# Patient Record
Sex: MALE | Race: BLACK OR AFRICAN AMERICAN | URBAN - METROPOLITAN AREA
[De-identification: names, ages, dates, MRNs, and addresses within clinical notes are randomized per-mention and may not be internally consistent; named-entity substitution may affect disease eponyms.]

---

## 2019-07-17 ENCOUNTER — OFFICE (OUTPATIENT)
Dept: URBAN - METROPOLITAN AREA CLINIC 19 | Facility: CLINIC | Age: 64
End: 2019-07-17

## 2019-07-17 VITALS
HEART RATE: 81 BPM | HEIGHT: 74 IN | SYSTOLIC BLOOD PRESSURE: 124 MMHG | WEIGHT: 220 LBS | DIASTOLIC BLOOD PRESSURE: 83 MMHG

## 2019-07-17 DIAGNOSIS — M43.8X9 OTHER SPECIFIED DEFORMING DORSOPATHIES, SITE UNSPECIFIED: ICD-10-CM

## 2019-07-17 DIAGNOSIS — R14.0 ABDOMINAL DISTENSION (GASEOUS): ICD-10-CM

## 2019-07-17 DIAGNOSIS — K59.00 CONSTIPATION, UNSPECIFIED: ICD-10-CM

## 2019-07-17 DIAGNOSIS — R10.9 UNSPECIFIED ABDOMINAL PAIN: ICD-10-CM

## 2019-07-17 LAB
CBC, PLATELET, NO DIFFERENTIAL: HEMATOCRIT: 40.4 % (ref 37.5–51)
CBC, PLATELET, NO DIFFERENTIAL: HEMOGLOBIN: 13.5 G/DL (ref 13–17.7)
CBC, PLATELET, NO DIFFERENTIAL: MCH: 30.4 PG (ref 26.6–33)
CBC, PLATELET, NO DIFFERENTIAL: MCHC: 33.4 G/DL (ref 31.5–35.7)
CBC, PLATELET, NO DIFFERENTIAL: MCV: 91 FL (ref 79–97)
CBC, PLATELET, NO DIFFERENTIAL: PLATELETS: 379 X10E3/UL (ref 150–450)
CBC, PLATELET, NO DIFFERENTIAL: RBC: 4.44 X10E6/UL (ref 4.14–5.8)
CBC, PLATELET, NO DIFFERENTIAL: RDW: 13.5 % (ref 12.3–15.4)
CBC, PLATELET, NO DIFFERENTIAL: WBC: 9.6 X10E3/UL (ref 3.4–10.8)
COMP. METABOLIC PANEL (14): A/G RATIO: 1.3 (ref 1.2–2.2)
COMP. METABOLIC PANEL (14): ALBUMIN: 4.3 G/DL (ref 3.6–4.8)
COMP. METABOLIC PANEL (14): ALKALINE PHOSPHATASE: 413 IU/L — HIGH (ref 39–117)
COMP. METABOLIC PANEL (14): ALT (SGPT): 24 IU/L (ref 0–44)
COMP. METABOLIC PANEL (14): AST (SGOT): 30 IU/L (ref 0–40)
COMP. METABOLIC PANEL (14): BILIRUBIN, TOTAL: 0.2 MG/DL (ref 0–1.2)
COMP. METABOLIC PANEL (14): BUN/CREATININE RATIO: 15 (ref 10–24)
COMP. METABOLIC PANEL (14): BUN: 20 MG/DL (ref 8–27)
COMP. METABOLIC PANEL (14): CALCIUM: 10.1 MG/DL (ref 8.6–10.2)
COMP. METABOLIC PANEL (14): CARBON DIOXIDE, TOTAL: 21 MMOL/L (ref 20–29)
COMP. METABOLIC PANEL (14): CHLORIDE: 109 MMOL/L — HIGH (ref 96–106)
COMP. METABOLIC PANEL (14): CREATININE: 1.37 MG/DL — HIGH (ref 0.76–1.27)
COMP. METABOLIC PANEL (14): EGFR IF AFRICN AM: 63 ML/MIN/1.73 (ref 59–?)
COMP. METABOLIC PANEL (14): EGFR IF NONAFRICN AM: 55 ML/MIN/1.73 — LOW (ref 59–?)
COMP. METABOLIC PANEL (14): GLOBULIN, TOTAL: 3.3 G/DL (ref 1.5–4.5)
COMP. METABOLIC PANEL (14): GLUCOSE: 106 MG/DL — HIGH (ref 65–99)
COMP. METABOLIC PANEL (14): POTASSIUM: 5.9 MMOL/L — HIGH (ref 3.5–5.2)
COMP. METABOLIC PANEL (14): PROTEIN, TOTAL: 7.6 G/DL (ref 6–8.5)
COMP. METABOLIC PANEL (14): SODIUM: 145 MMOL/L — HIGH (ref 134–144)
ENDOMYSIAL ANTIBODY IGA: NEGATIVE
IMMUNOGLOBULIN A, QN, SERUM: 304 MG/DL (ref 61–437)
T-TRANSGLUTAMINASE (TTG) IGA: <2 U/ML
TSH: 1.05 UIU/ML (ref 0.45–4.5)

## 2019-07-17 PROCEDURE — 99204 OFFICE O/P NEW MOD 45 MIN: CPT | Performed by: INTERNAL MEDICINE

## 2019-07-17 RX ORDER — HYOSCYAMINE SULFATE 0.12 MG/1
TABLET ORAL; SUBLINGUAL
Qty: 120 | Refills: 3 | Status: ACTIVE
Start: 2019-07-17

## 2019-07-17 RX ORDER — SODIUM SULFATE, POTASSIUM SULFATE, MAGNESIUM SULFATE 17.5; 3.13; 1.6 G/ML; G/ML; G/ML
SOLUTION, CONCENTRATE ORAL
Qty: 1 | Refills: 0 | Status: COMPLETED
Start: 2019-07-17 | End: 2019-07-22

## 2019-07-17 NOTE — SERVICENOTES
The patient has had six weeks of what he describes as bloating and gas pain that will involve his left side, left abdomen, and back intermittently.  He states the pain is worse whenever he stands up.  He did request opioid pain medications from me for this not told him that I would not prescribe opioid pain medication for such pain. Patient is overdue for surveillance colonoscopy so we will need to go ahead and proceed with this.  I will go ahead and check some blood work as above.  Well as symptoms could be related to gas pain or constipation I do wonder of back pain is in the differential as he could have a pinched nerve that is radiating around to his side. We will go and proceed with colonoscopy and I will try to obtain the CT scan to ensure that it was with contrast.  Will also give a trial of antispasmodic to see if this may help.  If having severe pain he should go to the ED.

## 2019-07-17 NOTE — SERVICEHPINOTES
Mr. Garcia is a 64-year-old man here for evaluation of abdominal pain, flank pain, and back pain. The patient states that he has been having these issues for the past six weeks. He had issues initially a year ago when he was on a trip and eight hamburger which lasted for a few days and then went away. He states symptoms then came back when he had another large hamburger or [place] steak and symptoms have persisted. He states that he will get we describes as a gas pain in his left side of his abdomen that will sometimes moved to his left flank or back. It is worse whenever he stands up. It is worse with eating. It is better when passing gas or having a bowel movement. He states that he only has a bowel movement every 1-2 days but does not take anything on a regular basis for this. He has been occasionally taking milk of magnesia and Pepto-Bismol. He did have one black bowel movement on one day but has not had any since then. His last colonoscopy was in 2012 with removal of an adenomatous polyp. He is overdue for his next surveillance colonoscopy. He states that he did have a CT scan by his PCP which was unremarkable. He denies any fevers or chills. He states that he was referred to [facility] but did not go because he thinks this is due to his intestine. He has been taking opioid pain medicines because of this.

## 2019-07-19 ENCOUNTER — OFFICE (OUTPATIENT)
Dept: URBAN - METROPOLITAN AREA CLINIC 19 | Facility: CLINIC | Age: 64
End: 2019-07-19

## 2019-07-22 ENCOUNTER — AMBULATORY SURGICAL CENTER (OUTPATIENT)
Dept: URBAN - METROPOLITAN AREA SURGERY 3 | Facility: SURGERY | Age: 64
End: 2019-07-22

## 2019-07-22 ENCOUNTER — OFFICE (OUTPATIENT)
Dept: URBAN - METROPOLITAN AREA PATHOLOGY 22 | Facility: PATHOLOGY | Age: 64
End: 2019-07-22
Payer: COMMERCIAL

## 2019-07-22 VITALS
HEART RATE: 73 BPM | DIASTOLIC BLOOD PRESSURE: 83 MMHG | OXYGEN SATURATION: 95 % | DIASTOLIC BLOOD PRESSURE: 60 MMHG | HEIGHT: 74 IN | HEART RATE: 81 BPM | DIASTOLIC BLOOD PRESSURE: 77 MMHG | DIASTOLIC BLOOD PRESSURE: 76 MMHG | HEART RATE: 88 BPM | RESPIRATION RATE: 24 BRPM | SYSTOLIC BLOOD PRESSURE: 138 MMHG | DIASTOLIC BLOOD PRESSURE: 83 MMHG | DIASTOLIC BLOOD PRESSURE: 84 MMHG | SYSTOLIC BLOOD PRESSURE: 138 MMHG | SYSTOLIC BLOOD PRESSURE: 129 MMHG | HEART RATE: 88 BPM | TEMPERATURE: 97.6 F | SYSTOLIC BLOOD PRESSURE: 130 MMHG | OXYGEN SATURATION: 94 % | HEIGHT: 74 IN | DIASTOLIC BLOOD PRESSURE: 77 MMHG | OXYGEN SATURATION: 96 % | HEART RATE: 88 BPM | SYSTOLIC BLOOD PRESSURE: 130 MMHG | DIASTOLIC BLOOD PRESSURE: 60 MMHG | OXYGEN SATURATION: 95 % | RESPIRATION RATE: 18 BRPM | RESPIRATION RATE: 21 BRPM | SYSTOLIC BLOOD PRESSURE: 84 MMHG | WEIGHT: 220 LBS | SYSTOLIC BLOOD PRESSURE: 84 MMHG | RESPIRATION RATE: 24 BRPM | DIASTOLIC BLOOD PRESSURE: 76 MMHG | DIASTOLIC BLOOD PRESSURE: 84 MMHG | SYSTOLIC BLOOD PRESSURE: 124 MMHG | DIASTOLIC BLOOD PRESSURE: 83 MMHG | SYSTOLIC BLOOD PRESSURE: 84 MMHG | SYSTOLIC BLOOD PRESSURE: 138 MMHG | WEIGHT: 220 LBS | HEIGHT: 74 IN | HEART RATE: 86 BPM | HEART RATE: 81 BPM | OXYGEN SATURATION: 95 % | HEART RATE: 86 BPM | HEART RATE: 73 BPM | DIASTOLIC BLOOD PRESSURE: 77 MMHG | SYSTOLIC BLOOD PRESSURE: 129 MMHG | WEIGHT: 220 LBS | RESPIRATION RATE: 18 BRPM | OXYGEN SATURATION: 96 % | RESPIRATION RATE: 18 BRPM | SYSTOLIC BLOOD PRESSURE: 130 MMHG | HEART RATE: 86 BPM | SYSTOLIC BLOOD PRESSURE: 124 MMHG | HEART RATE: 73 BPM | DIASTOLIC BLOOD PRESSURE: 60 MMHG | SYSTOLIC BLOOD PRESSURE: 124 MMHG | RESPIRATION RATE: 21 BRPM | OXYGEN SATURATION: 96 % | OXYGEN SATURATION: 94 % | RESPIRATION RATE: 21 BRPM | SYSTOLIC BLOOD PRESSURE: 129 MMHG | RESPIRATION RATE: 24 BRPM | HEART RATE: 81 BPM | TEMPERATURE: 97.6 F | TEMPERATURE: 97.6 F | OXYGEN SATURATION: 94 % | DIASTOLIC BLOOD PRESSURE: 76 MMHG | DIASTOLIC BLOOD PRESSURE: 84 MMHG

## 2019-07-22 DIAGNOSIS — D12.4 BENIGN NEOPLASM OF DESCENDING COLON: ICD-10-CM

## 2019-07-22 DIAGNOSIS — K57.30 DIVERTICULOSIS OF LARGE INTESTINE WITHOUT PERFORATION OR ABS: ICD-10-CM

## 2019-07-22 DIAGNOSIS — K64.1 SECOND DEGREE HEMORRHOIDS: ICD-10-CM

## 2019-07-22 DIAGNOSIS — K63.89 OTHER SPECIFIED DISEASES OF INTESTINE: ICD-10-CM

## 2019-07-22 DIAGNOSIS — I10 ESSENTIAL (PRIMARY) HYPERTENSION: ICD-10-CM

## 2019-07-22 DIAGNOSIS — D12.2 BENIGN NEOPLASM OF ASCENDING COLON: ICD-10-CM

## 2019-07-22 DIAGNOSIS — Z86.010 PERSONAL HISTORY OF COLONIC POLYPS: ICD-10-CM

## 2019-07-22 LAB
ALK PHOS ISOENZYME: BONE FRACTION: 85 % — HIGH (ref 12–68)
ALK PHOS ISOENZYME: INTESTINAL FRAC.: 3 % (ref 0–18)
ALK PHOS ISOENZYME: LIVER FRACTION: 12 % — LOW (ref 13–88)
COMP. METABOLIC PANEL (14): A/G RATIO: 1.4 (ref 1.2–2.2)
COMP. METABOLIC PANEL (14): ALBUMIN: 4.5 G/DL (ref 3.6–4.8)
COMP. METABOLIC PANEL (14): ALKALINE PHOSPHATASE: 538 IU/L — HIGH (ref 39–117)
COMP. METABOLIC PANEL (14): ALT (SGPT): 25 IU/L (ref 0–44)
COMP. METABOLIC PANEL (14): AST (SGOT): 30 IU/L (ref 0–40)
COMP. METABOLIC PANEL (14): BILIRUBIN, TOTAL: 0.4 MG/DL (ref 0–1.2)
COMP. METABOLIC PANEL (14): BUN/CREATININE RATIO: 12 (ref 10–24)
COMP. METABOLIC PANEL (14): BUN: 15 MG/DL (ref 8–27)
COMP. METABOLIC PANEL (14): CALCIUM: 9.7 MG/DL (ref 8.6–10.2)
COMP. METABOLIC PANEL (14): CARBON DIOXIDE, TOTAL: 21 MMOL/L (ref 20–29)
COMP. METABOLIC PANEL (14): CHLORIDE: 103 MMOL/L (ref 96–106)
COMP. METABOLIC PANEL (14): CREATININE: 1.22 MG/DL (ref 0.76–1.27)
COMP. METABOLIC PANEL (14): EGFR IF AFRICN AM: 73 ML/MIN/1.73 (ref 59–?)
COMP. METABOLIC PANEL (14): EGFR IF NONAFRICN AM: 63 ML/MIN/1.73 (ref 59–?)
COMP. METABOLIC PANEL (14): GLOBULIN, TOTAL: 3.3 G/DL (ref 1.5–4.5)
COMP. METABOLIC PANEL (14): GLUCOSE: 82 MG/DL (ref 65–99)
COMP. METABOLIC PANEL (14): POTASSIUM: 5.4 MMOL/L — HIGH (ref 3.5–5.2)
COMP. METABOLIC PANEL (14): PROTEIN, TOTAL: 7.8 G/DL (ref 6–8.5)
COMP. METABOLIC PANEL (14): SODIUM: 140 MMOL/L (ref 134–144)
HBSAG SCREEN: NEGATIVE
HCV ANTIBODY: HEP C VIRUS AB: <0.1 S/CO RATIO
HEP A AB, TOTAL: NEGATIVE
HEP B CORE AB, IGM: NEGATIVE
HEP B CORE AB, TOT: NEGATIVE
HEP B SURFACE AB: HEP B SURFACE AB, QUAL: NON REACTIVE

## 2019-07-22 PROCEDURE — 45380 COLONOSCOPY AND BIOPSY: CPT | Mod: 59 | Performed by: INTERNAL MEDICINE

## 2019-07-22 PROCEDURE — 88305 TISSUE EXAM BY PATHOLOGIST: CPT | Performed by: INTERNAL MEDICINE

## 2019-07-22 PROCEDURE — 45385 COLONOSCOPY W/LESION REMOVAL: CPT | Mod: 33 | Performed by: INTERNAL MEDICINE

## 2025-01-20 NOTE — SERVICEROSSYSTEMNOTES
Endocrinology In Patient Consult   This consult is purely from chart review and patient was never seen in person.     Will ask  to reach out to patient today 01/20/2025 at 08:26AM to schedule new patient appointment on Friday at 10:30.     Agree with Bowen's plan to resume DDAVP intra-nasally until that time.    I will order follow up sodium at the appointment.    Bea Mc MD  [facility]      dark stool sunday morning 7/21/2019